# Patient Record
Sex: FEMALE | Race: WHITE | URBAN - METROPOLITAN AREA
[De-identification: names, ages, dates, MRNs, and addresses within clinical notes are randomized per-mention and may not be internally consistent; named-entity substitution may affect disease eponyms.]

---

## 2020-08-17 ENCOUNTER — WEB ENCOUNTER (OUTPATIENT)
Dept: URBAN - METROPOLITAN AREA CLINIC 113 | Facility: CLINIC | Age: 75
End: 2020-08-17

## 2020-08-17 ENCOUNTER — LAB OUTSIDE AN ENCOUNTER (OUTPATIENT)
Dept: URBAN - METROPOLITAN AREA CLINIC 113 | Facility: CLINIC | Age: 75
End: 2020-08-17

## 2020-08-17 ENCOUNTER — OFFICE VISIT (OUTPATIENT)
Dept: URBAN - METROPOLITAN AREA CLINIC 113 | Facility: CLINIC | Age: 75
End: 2020-08-17
Payer: MEDICARE

## 2020-08-17 VITALS
HEART RATE: 72 BPM | TEMPERATURE: 96.9 F | WEIGHT: 179 LBS | SYSTOLIC BLOOD PRESSURE: 148 MMHG | RESPIRATION RATE: 18 BRPM | HEIGHT: 64 IN | DIASTOLIC BLOOD PRESSURE: 81 MMHG | BODY MASS INDEX: 30.56 KG/M2

## 2020-08-17 DIAGNOSIS — R10.13 EPIGASTRIC ABDOMINAL PAIN: ICD-10-CM

## 2020-08-17 DIAGNOSIS — R11.0 NAUSEA: ICD-10-CM

## 2020-08-17 DIAGNOSIS — K21.9 GERD: ICD-10-CM

## 2020-08-17 DIAGNOSIS — K59.01 CONSTIPATION: ICD-10-CM

## 2020-08-17 PROCEDURE — G8427 DOCREV CUR MEDS BY ELIG CLIN: HCPCS | Performed by: NURSE PRACTITIONER

## 2020-08-17 PROCEDURE — 99204 OFFICE O/P NEW MOD 45 MIN: CPT | Performed by: NURSE PRACTITIONER

## 2020-08-17 PROCEDURE — 1036F TOBACCO NON-USER: CPT | Performed by: NURSE PRACTITIONER

## 2020-08-17 RX ORDER — LINACLOTIDE 72 UG/1
1 CAPSULE AT LEAST 30 MINUTES BEFORE THE FIRST MEAL OF THE DAY ON AN EMPTY STOMACH CAPSULE, GELATIN COATED ORAL ONCE A DAY
Qty: 30 | Refills: 3 | OUTPATIENT
Start: 2020-08-17 | End: 2020-12-14

## 2020-08-17 RX ORDER — LINACLOTIDE 145 UG/1
1 CAPSULE AT LEAST 30 MINUTES BEFORE THE FIRST MEAL OF THE DAY ON AN EMPTY STOMACH CAPSULE, GELATIN COATED ORAL ONCE A DAY
Status: ACTIVE | COMMUNITY

## 2020-08-17 RX ORDER — METOPROLOL TARTRATE 25 MG/1
1 TABLET WITH FOOD TABLET, FILM COATED ORAL TWICE A DAY
Status: ACTIVE | COMMUNITY

## 2020-08-17 RX ORDER — OMEPRAZOLE 20 MG/1
1 CAPSULE 30 MINUTES BEFORE MORNING MEAL CAPSULE, DELAYED RELEASE ORAL ONCE A DAY
Status: ACTIVE | COMMUNITY

## 2020-08-17 RX ORDER — TRIAMTERENE AND HYDROCHLOROTHIAZIDE 75; 50 MG/1; MG/1
1 TABLET IN THE MORNING TABLET ORAL ONCE A DAY
Status: ACTIVE | COMMUNITY

## 2020-08-17 RX ORDER — ROSUVASTATIN CALCIUM 10 MG/1
1 TABLET TABLET, FILM COATED ORAL ONCE A DAY
Status: ACTIVE | COMMUNITY

## 2020-08-17 RX ORDER — EZETIMIBE 10 MG/1
1 TABLET TABLET ORAL ONCE A DAY
Status: ACTIVE | COMMUNITY

## 2020-08-17 RX ORDER — OMEPRAZOLE 40 MG/1
1 CAPSULE 30 MINUTES BEFORE MORNING MEAL CAPSULE, DELAYED RELEASE ORAL ONCE A DAY
Qty: 90 | Refills: 3

## 2020-08-17 NOTE — HPI-TODAY'S VISIT:
She reports a several year history of intermittent abdominal pain, bloating, and nausea, which have worsened within the last several months.  She complains of daily exacerbations of postprandial epigastric pain, excess belching, heartburn and regurgitation despite omeprazole 20 mg daily.  She has chronic constipation at baseline, and is having a bowel movement every 2 to 3 days with Metamucil.  She takes Linzess 145 mcg as needed, due to the side effect of diarrhea.  In the past, MiraLAX was ineffective, and milk of magnesia resulted in diarrhea.  She reports occasional small-volume red blood per rectum with wiping following a hard or straining stool. Her last colonoscopy was performed in 2016 by Dr. Welch, and was notable for the removal of colon polyps.  She is due for surveillance colonoscopy next year.  She admits to the use of Advil at least twice daily for management of headaches.  She has never had an EGD.  No recent abdominal imaging.  She denies a family history of GI malignancy.

## 2020-08-24 ENCOUNTER — CLAIMS CREATED FROM THE CLAIM WINDOW (OUTPATIENT)
Dept: URBAN - METROPOLITAN AREA CLINIC 4 | Facility: CLINIC | Age: 75
End: 2020-08-24
Payer: MEDICARE

## 2020-08-24 ENCOUNTER — OFFICE VISIT (OUTPATIENT)
Dept: URBAN - METROPOLITAN AREA SURGERY CENTER 25 | Facility: SURGERY CENTER | Age: 75
End: 2020-08-24
Payer: MEDICARE

## 2020-08-24 DIAGNOSIS — K29.60 OTHER GASTRITIS WITHOUT BLEEDING: ICD-10-CM

## 2020-08-24 DIAGNOSIS — K31.7 GASTRIC POLYP: ICD-10-CM

## 2020-08-24 DIAGNOSIS — R13.10 DYSPHAGIA: ICD-10-CM

## 2020-08-24 DIAGNOSIS — K44.9 HIATAL HERNIA: ICD-10-CM

## 2020-08-24 DIAGNOSIS — K31.7 POLYP OF STOMACH AND DUODENUM: ICD-10-CM

## 2020-08-24 DIAGNOSIS — K21.9 GASTRIC REFLUX: ICD-10-CM

## 2020-08-24 PROCEDURE — 88305 TISSUE EXAM BY PATHOLOGIST: CPT | Performed by: PATHOLOGY

## 2020-08-24 PROCEDURE — 43239 EGD BIOPSY SINGLE/MULTIPLE: CPT | Performed by: INTERNAL MEDICINE

## 2020-08-24 PROCEDURE — 88312 SPECIAL STAINS GROUP 1: CPT | Performed by: PATHOLOGY

## 2020-08-24 RX ORDER — LINACLOTIDE 145 UG/1
1 CAPSULE AT LEAST 30 MINUTES BEFORE THE FIRST MEAL OF THE DAY ON AN EMPTY STOMACH CAPSULE, GELATIN COATED ORAL ONCE A DAY
Status: ACTIVE | COMMUNITY

## 2020-08-24 RX ORDER — ROSUVASTATIN CALCIUM 10 MG/1
1 TABLET TABLET, FILM COATED ORAL ONCE A DAY
Status: ACTIVE | COMMUNITY

## 2020-08-24 RX ORDER — TRIAMTERENE AND HYDROCHLOROTHIAZIDE 75; 50 MG/1; MG/1
1 TABLET IN THE MORNING TABLET ORAL ONCE A DAY
Status: ACTIVE | COMMUNITY

## 2020-08-24 RX ORDER — METOPROLOL TARTRATE 25 MG/1
1 TABLET WITH FOOD TABLET, FILM COATED ORAL TWICE A DAY
Status: ACTIVE | COMMUNITY

## 2020-08-24 RX ORDER — OMEPRAZOLE 40 MG/1
1 CAPSULE 30 MINUTES BEFORE MORNING MEAL CAPSULE, DELAYED RELEASE ORAL ONCE A DAY
Qty: 90 | Refills: 3 | Status: ACTIVE | COMMUNITY

## 2020-08-24 RX ORDER — LINACLOTIDE 72 UG/1
1 CAPSULE AT LEAST 30 MINUTES BEFORE THE FIRST MEAL OF THE DAY ON AN EMPTY STOMACH CAPSULE, GELATIN COATED ORAL ONCE A DAY
Qty: 30 | Refills: 3 | Status: ACTIVE | COMMUNITY
Start: 2020-08-17 | End: 2020-12-14

## 2020-08-24 RX ORDER — EZETIMIBE 10 MG/1
1 TABLET TABLET ORAL ONCE A DAY
Status: ACTIVE | COMMUNITY

## 2020-09-01 ENCOUNTER — OFFICE VISIT (OUTPATIENT)
Dept: URBAN - METROPOLITAN AREA CLINIC 113 | Facility: CLINIC | Age: 75
End: 2020-09-01
Payer: MEDICARE

## 2020-09-01 VITALS
SYSTOLIC BLOOD PRESSURE: 123 MMHG | HEART RATE: 77 BPM | RESPIRATION RATE: 20 BRPM | TEMPERATURE: 98.4 F | DIASTOLIC BLOOD PRESSURE: 76 MMHG | HEIGHT: 64 IN | BODY MASS INDEX: 30.3 KG/M2 | WEIGHT: 177.5 LBS

## 2020-09-01 DIAGNOSIS — K21.9 GERD: ICD-10-CM

## 2020-09-01 DIAGNOSIS — K59.01 CONSTIPATION: ICD-10-CM

## 2020-09-01 DIAGNOSIS — R11.0 NAUSEA: ICD-10-CM

## 2020-09-01 DIAGNOSIS — R10.13 EPIGASTRIC ABDOMINAL PAIN: ICD-10-CM

## 2020-09-01 PROCEDURE — 99213 OFFICE O/P EST LOW 20 MIN: CPT | Performed by: NURSE PRACTITIONER

## 2020-09-01 PROCEDURE — G8427 DOCREV CUR MEDS BY ELIG CLIN: HCPCS | Performed by: NURSE PRACTITIONER

## 2020-09-01 RX ORDER — ROSUVASTATIN CALCIUM 10 MG/1
1 TABLET TABLET, FILM COATED ORAL ONCE A DAY
Status: ACTIVE | COMMUNITY

## 2020-09-01 RX ORDER — LINACLOTIDE 145 UG/1
1 CAPSULE AT LEAST 30 MINUTES BEFORE THE FIRST MEAL OF THE DAY ON AN EMPTY STOMACH CAPSULE, GELATIN COATED ORAL ONCE A DAY
Status: DISCONTINUED | COMMUNITY

## 2020-09-01 RX ORDER — EZETIMIBE 10 MG/1
1 TABLET TABLET ORAL ONCE A DAY
Status: ACTIVE | COMMUNITY

## 2020-09-01 RX ORDER — ONDANSETRON 4 MG/1
PLACE 1 TABLET ON THE TONGUE AND ALLOW TO DISSOLVE, EVERY 4-6 HOURS AS NEEDED FOR NAUSEA TABLET, ORALLY DISINTEGRATING ORAL
Qty: 45 TABLET | Refills: 1 | OUTPATIENT
Start: 2020-09-01

## 2020-09-01 RX ORDER — LINACLOTIDE 72 UG/1
1 CAPSULE AT LEAST 30 MINUTES BEFORE THE FIRST MEAL OF THE DAY ON AN EMPTY STOMACH CAPSULE, GELATIN COATED ORAL ONCE A DAY
Qty: 30 | Refills: 3 | Status: ACTIVE | COMMUNITY
Start: 2020-08-17 | End: 2020-12-14

## 2020-09-01 RX ORDER — METOPROLOL TARTRATE 25 MG/1
1/2 TABLET WITH FOOD TABLET, FILM COATED ORAL TWICE A DAY
Status: ACTIVE | COMMUNITY

## 2020-09-01 RX ORDER — TRIAMTERENE AND HYDROCHLOROTHIAZIDE 75; 50 MG/1; MG/1
1 TABLET IN THE MORNING TABLET ORAL ONCE A DAY
Status: ACTIVE | COMMUNITY

## 2020-09-01 RX ORDER — LINACLOTIDE 72 UG/1
1 CAPSULE AT LEAST 30 MINUTES BEFORE THE FIRST MEAL OF THE DAY ON AN EMPTY STOMACH CAPSULE, GELATIN COATED ORAL ONCE A DAY
Qty: 90 | Refills: 3

## 2020-09-01 RX ORDER — OMEPRAZOLE 40 MG/1
1 CAPSULE 30 MINUTES BEFORE MORNING MEAL CAPSULE, DELAYED RELEASE ORAL ONCE A DAY
Qty: 90 | Refills: 3 | Status: ACTIVE | COMMUNITY

## 2020-09-01 NOTE — HPI-TODAY'S VISIT:
74-year-old female with history of hypertension, hyperlipidemia, and colon polyps, presenting for follow-up after EGD. She was last seen 8/17/2020 for evaluation of postprandial epigastric pain, nausea, and GERD, refractory to low-dose PPI.  She was instructed to increase omeprazole to 40 mg daily, and labs, a CT of the abdomen and pelvis, and EGD were planned.  Regarding chronic constipation, her Linzess was reduced to 72mcg daily due to the side effect of diarrhea with 145mcg dose. CT of the abdomen and pelvis with contrast on 8/31/2020 was notable for diverticulosis in the sigmoid colon, several simple appearing cysts in both kidneys, and prior hysterectomy. EGD 8/24/2020 revealed a normal esophagus, medium-sized hiatal hernia, chronic gastritis, and a single gastric polyp. We are awaiting pathology results. She had her bloodwork performed at [facility], however, results are not readily available for review. Fortunately, her abdominal symptoms have nearly resolved. She has only experienced one episode of recurrent postprandial abdominal pain, which she attributes to dietary indiscretion. She has occasional nausea, but has not experienced further vomiting. Her GERD is controlled with omeprazole. She is consuming smaller, more frequent meals, and has avoided eating late in the evening. She is keeping a food and symptom diary in an effort to identify triggers. Her constipation has responded to low-dose Linzess, and she is having a daily nonbloody stool. She has not experienced diarrhea with a reduction in dosing.

## 2020-11-17 ENCOUNTER — OFFICE VISIT (OUTPATIENT)
Dept: URBAN - METROPOLITAN AREA CLINIC 113 | Facility: CLINIC | Age: 75
End: 2020-11-17
Payer: MEDICARE

## 2020-11-17 ENCOUNTER — LAB OUTSIDE AN ENCOUNTER (OUTPATIENT)
Dept: URBAN - METROPOLITAN AREA CLINIC 113 | Facility: CLINIC | Age: 75
End: 2020-11-17

## 2020-11-17 VITALS
HEART RATE: 77 BPM | RESPIRATION RATE: 18 BRPM | BODY MASS INDEX: 31.41 KG/M2 | DIASTOLIC BLOOD PRESSURE: 74 MMHG | TEMPERATURE: 97.6 F | SYSTOLIC BLOOD PRESSURE: 133 MMHG | WEIGHT: 184 LBS | HEIGHT: 64 IN

## 2020-11-17 DIAGNOSIS — R14.0 ABDOMINAL BLOATING: ICD-10-CM

## 2020-11-17 DIAGNOSIS — R11.0 NAUSEA: ICD-10-CM

## 2020-11-17 DIAGNOSIS — K21.9 GERD: ICD-10-CM

## 2020-11-17 DIAGNOSIS — Z86.010 HISTORY OF COLON POLYPS: ICD-10-CM

## 2020-11-17 DIAGNOSIS — K59.01 CONSTIPATION: ICD-10-CM

## 2020-11-17 DIAGNOSIS — R10.13 EPIGASTRIC ABDOMINAL PAIN: ICD-10-CM

## 2020-11-17 DIAGNOSIS — R10.30 LOWER ABDOMINAL PAIN: ICD-10-CM

## 2020-11-17 PROCEDURE — G8483 FLU IMM NO ADMIN DOC REA: HCPCS | Performed by: INTERNAL MEDICINE

## 2020-11-17 PROCEDURE — 99214 OFFICE O/P EST MOD 30 MIN: CPT | Performed by: INTERNAL MEDICINE

## 2020-11-17 PROCEDURE — G8420 CALC BMI NORM PARAMETERS: HCPCS | Performed by: INTERNAL MEDICINE

## 2020-11-17 PROCEDURE — 3017F COLORECTAL CA SCREEN DOC REV: CPT | Performed by: INTERNAL MEDICINE

## 2020-11-17 RX ORDER — METOPROLOL TARTRATE 25 MG/1
1/2 TABLET WITH FOOD TABLET, FILM COATED ORAL TWICE A DAY
Status: ACTIVE | COMMUNITY

## 2020-11-17 RX ORDER — HYOSCYAMINE SULFATE 0.12 MG/1
1 TABLET UNDER THE TONGUE AND ALLOW TO DISSOLVE  AS NEEDED TABLET, ORALLY DISINTEGRATING ORAL THREE TIMES A DAY
Qty: 90 | Refills: 2 | OUTPATIENT

## 2020-11-17 RX ORDER — LINACLOTIDE 72 UG/1
1 CAPSULE AT LEAST 30 MINUTES BEFORE THE FIRST MEAL OF THE DAY ON AN EMPTY STOMACH CAPSULE, GELATIN COATED ORAL ONCE A DAY
Qty: 90 | Refills: 3 | Status: ACTIVE | COMMUNITY

## 2020-11-17 RX ORDER — RIFAXIMIN 550 MG/1
1 TABLET TABLET ORAL TWICE A DAY
Qty: 60 | OUTPATIENT
Start: 2020-11-17 | End: 2020-12-17

## 2020-11-17 RX ORDER — OMEPRAZOLE 40 MG/1
1 CAPSULE 30 MINUTES BEFORE MORNING MEAL CAPSULE, DELAYED RELEASE ORAL ONCE A DAY
Qty: 90 | Refills: 3 | Status: ACTIVE | COMMUNITY

## 2020-11-17 RX ORDER — ROSUVASTATIN CALCIUM 10 MG/1
1 TABLET TABLET, FILM COATED ORAL ONCE A DAY
Status: ACTIVE | COMMUNITY

## 2020-11-17 RX ORDER — ONDANSETRON 4 MG/1
PLACE 1 TABLET ON THE TONGUE AND ALLOW TO DISSOLVE, EVERY 4-6 HOURS AS NEEDED FOR NAUSEA TABLET, ORALLY DISINTEGRATING ORAL
Qty: 45 TABLET | Refills: 1 | Status: ACTIVE | COMMUNITY
Start: 2020-09-01

## 2020-11-17 RX ORDER — SODIUM, POTASSIUM,MAG SULFATES 17.5-3.13G
354ML SOLUTION, RECONSTITUTED, ORAL ORAL
Qty: 20 | Refills: 1 | OUTPATIENT

## 2020-11-17 RX ORDER — EZETIMIBE 10 MG/1
1 TABLET TABLET ORAL ONCE A DAY
Status: ACTIVE | COMMUNITY

## 2020-11-17 RX ORDER — TRIAMTERENE AND HYDROCHLOROTHIAZIDE 75; 50 MG/1; MG/1
1 TABLET IN THE MORNING TABLET ORAL ONCE A DAY
Status: ACTIVE | COMMUNITY

## 2020-11-17 NOTE — HPI-TODAY'S VISIT:
74-year-old female with history of hypertension, hyperlipidemia, and colon polyps, presenting for follow-up after EGD. She was seen 8/17/2020 for evaluation of postprandial epigastric pain, nausea, and GERD, refractory to low-dose PPI.  She was instructed to increase omeprazole to 40 mg daily, and labs, a CT of the abdomen and pelvis, and EGD were planned.  Regarding chronic constipation, her Linzess was reduced to 72mcg daily due to the side effect of diarrhea with 145mcg dose. CT of the abdomen and pelvis with contrast on 8/31/2020 was notable for diverticulosis in the sigmoid colon, several simple appearing cysts in both kidneys, and prior hysterectomy. EGD 8/24/2020 revealed a normal esophagus, medium-sized hiatal hernia, chronic gastritis, and a single gastric polyp. We are awaiting pathology results. She had her bloodwork performed at [facility], however, results are not readily available for review. At the time of her last office visit on 9/1/20, her abdominal symptoms had nearly resolved. She had only experienced one episode of recurrent postprandial abdominal pain, which she attributed to dietary indiscretion. She noted occasional nausea, but had not experienced further vomiting. Her GERD was controlled with omeprazole. She was consuming smaller, more frequent meals, and has avoided eating late in the evening. She is keeping a food and symptom diary in an effort to identify triggers. Her constipation had responded to low-dose Linzess, and she was having a daily nonbloody stool. She has not experienced diarrhea after the reduction in Linzess dosing. The patient returns today with no further burning epigastric pain and reflux symptoms, which are controlled on omeprazole.  She does describe postprandial intermittent epigastric and periumbilical pain which is not present every day for which comes and goes.  She feels bloated and gassy after eating.  When she has these episodes of pain, they can last all day.  Her bowel habits remain somewhat unpredictable.  When she takes low-dose Linzess, she will have a large somewhat loose bowel movement.  She did have labs done at [facility] on May 11, 2020 which showed a normal CBC, sedimentation rate of 20, and normal complete metabolic panel. Of note, the patient's last colonoscopy was done by Dr. Welch in [place] in 2016.  This was reportedly normal. She remains concerned about the persistent nature of her symptoms and her alteration in bowel habits.  Notably, the patient has lost no weight.

## 2021-01-07 ENCOUNTER — OFFICE VISIT (OUTPATIENT)
Dept: URBAN - METROPOLITAN AREA SURGERY CENTER 25 | Facility: SURGERY CENTER | Age: 76
End: 2021-01-07

## 2021-03-08 ENCOUNTER — TELEPHONE ENCOUNTER (OUTPATIENT)
Dept: URBAN - METROPOLITAN AREA CLINIC 113 | Facility: CLINIC | Age: 76
End: 2021-03-08

## 2021-03-10 PROBLEM — 422587007 NAUSEA: Status: ACTIVE | Noted: 2020-08-17

## 2021-03-18 ENCOUNTER — CLAIMS CREATED FROM THE CLAIM WINDOW (OUTPATIENT)
Dept: URBAN - METROPOLITAN AREA CLINIC 4 | Facility: CLINIC | Age: 76
End: 2021-03-18
Payer: MEDICARE

## 2021-03-18 ENCOUNTER — OFFICE VISIT (OUTPATIENT)
Dept: URBAN - METROPOLITAN AREA SURGERY CENTER 25 | Facility: SURGERY CENTER | Age: 76
End: 2021-03-18
Payer: MEDICARE

## 2021-03-18 DIAGNOSIS — D12.3 BENIGN NEOPLASM OF TRANSVERSE COLON: ICD-10-CM

## 2021-03-18 DIAGNOSIS — D12.3 ADENOMA OF TRANSVERSE COLON: ICD-10-CM

## 2021-03-18 DIAGNOSIS — Z86.010 H/O ADENOMATOUS POLYP OF COLON: ICD-10-CM

## 2021-03-18 PROCEDURE — 45385 COLONOSCOPY W/LESION REMOVAL: CPT | Performed by: INTERNAL MEDICINE

## 2021-03-18 PROCEDURE — 88305 TISSUE EXAM BY PATHOLOGIST: CPT | Performed by: PATHOLOGY

## 2021-03-18 PROCEDURE — G8907 PT DOC NO EVENTS ON DISCHARG: HCPCS | Performed by: INTERNAL MEDICINE

## 2021-03-18 RX ORDER — METOPROLOL TARTRATE 25 MG/1
1/2 TABLET WITH FOOD TABLET, FILM COATED ORAL TWICE A DAY
Status: ACTIVE | COMMUNITY

## 2021-03-18 RX ORDER — HYOSCYAMINE SULFATE 0.12 MG/1
1 TABLET UNDER THE TONGUE AND ALLOW TO DISSOLVE  AS NEEDED TABLET, ORALLY DISINTEGRATING ORAL THREE TIMES A DAY
Qty: 90 | Refills: 2 | Status: ACTIVE | COMMUNITY

## 2021-03-18 RX ORDER — SODIUM, POTASSIUM,MAG SULFATES 17.5-3.13G
354ML SOLUTION, RECONSTITUTED, ORAL ORAL
Qty: 20 | Refills: 1 | Status: ACTIVE | COMMUNITY

## 2021-03-18 RX ORDER — LINACLOTIDE 72 UG/1
1 CAPSULE AT LEAST 30 MINUTES BEFORE THE FIRST MEAL OF THE DAY ON AN EMPTY STOMACH CAPSULE, GELATIN COATED ORAL ONCE A DAY
Qty: 90 | Refills: 3 | Status: ACTIVE | COMMUNITY

## 2021-03-18 RX ORDER — TRIAMTERENE AND HYDROCHLOROTHIAZIDE 75; 50 MG/1; MG/1
1 TABLET IN THE MORNING TABLET ORAL ONCE A DAY
Status: ACTIVE | COMMUNITY

## 2021-03-18 RX ORDER — ROSUVASTATIN CALCIUM 10 MG/1
1 TABLET TABLET, FILM COATED ORAL ONCE A DAY
Status: ACTIVE | COMMUNITY

## 2021-03-18 RX ORDER — ONDANSETRON 4 MG/1
PLACE 1 TABLET ON THE TONGUE AND ALLOW TO DISSOLVE, EVERY 4-6 HOURS AS NEEDED FOR NAUSEA TABLET, ORALLY DISINTEGRATING ORAL
Qty: 45 TABLET | Refills: 1 | Status: ACTIVE | COMMUNITY
Start: 2020-09-01

## 2021-03-18 RX ORDER — OMEPRAZOLE 40 MG/1
1 CAPSULE 30 MINUTES BEFORE MORNING MEAL CAPSULE, DELAYED RELEASE ORAL ONCE A DAY
Qty: 90 | Refills: 3 | Status: ACTIVE | COMMUNITY

## 2021-03-18 RX ORDER — EZETIMIBE 10 MG/1
1 TABLET TABLET ORAL ONCE A DAY
Status: ACTIVE | COMMUNITY

## 2021-04-06 ENCOUNTER — TELEPHONE ENCOUNTER (OUTPATIENT)
Dept: URBAN - METROPOLITAN AREA CLINIC 113 | Facility: CLINIC | Age: 76
End: 2021-04-06

## 2021-04-06 RX ORDER — LINACLOTIDE 145 UG/1
1 CAPSULE AT LEAST 30 MINUTES BEFORE THE FIRST MEAL OF THE DAY ON AN EMPTY STOMACH CAPSULE, GELATIN COATED ORAL ONCE A DAY
Qty: 90 | Refills: 3

## 2021-04-22 ENCOUNTER — TELEPHONE ENCOUNTER (OUTPATIENT)
Dept: URBAN - METROPOLITAN AREA CLINIC 113 | Facility: CLINIC | Age: 76
End: 2021-04-22

## 2021-04-22 RX ORDER — SUCRALFATE 1 G/1
1 TABLET ON AN EMPTY STOMACH TABLET ORAL TWICE A DAY
Qty: 60 | OUTPATIENT
Start: 2021-04-22 | End: 2021-05-22

## 2021-05-17 ENCOUNTER — OFFICE VISIT (OUTPATIENT)
Dept: URBAN - METROPOLITAN AREA CLINIC 113 | Facility: CLINIC | Age: 76
End: 2021-05-17

## 2021-07-01 ENCOUNTER — WEB ENCOUNTER (OUTPATIENT)
Dept: URBAN - METROPOLITAN AREA CLINIC 107 | Facility: CLINIC | Age: 76
End: 2021-07-01

## 2021-07-01 ENCOUNTER — OFFICE VISIT (OUTPATIENT)
Dept: URBAN - METROPOLITAN AREA CLINIC 107 | Facility: CLINIC | Age: 76
End: 2021-07-01
Payer: MEDICARE

## 2021-07-01 VITALS
WEIGHT: 178 LBS | HEART RATE: 83 BPM | HEIGHT: 64 IN | BODY MASS INDEX: 30.39 KG/M2 | SYSTOLIC BLOOD PRESSURE: 147 MMHG | DIASTOLIC BLOOD PRESSURE: 83 MMHG | TEMPERATURE: 98.7 F

## 2021-07-01 DIAGNOSIS — K21.9 GERD: ICD-10-CM

## 2021-07-01 DIAGNOSIS — K59.01 CONSTIPATION: ICD-10-CM

## 2021-07-01 DIAGNOSIS — R10.13 EPIGASTRIC ABDOMINAL PAIN: ICD-10-CM

## 2021-07-01 DIAGNOSIS — Z86.010 HISTORY OF COLON POLYPS: ICD-10-CM

## 2021-07-01 PROCEDURE — 99213 OFFICE O/P EST LOW 20 MIN: CPT | Performed by: INTERNAL MEDICINE

## 2021-07-01 RX ORDER — LINACLOTIDE 145 UG/1
1 CAPSULE AT LEAST 30 MINUTES BEFORE THE FIRST MEAL OF THE DAY ON AN EMPTY STOMACH CAPSULE, GELATIN COATED ORAL ONCE A DAY
Qty: 90 | Refills: 4

## 2021-07-01 RX ORDER — SODIUM, POTASSIUM,MAG SULFATES 17.5-3.13G
354ML SOLUTION, RECONSTITUTED, ORAL ORAL
Qty: 20 | Refills: 1 | Status: ON HOLD | COMMUNITY

## 2021-07-01 RX ORDER — OMEPRAZOLE 40 MG/1
1 CAPSULE 30 MINUTES BEFORE MORNING MEAL CAPSULE, DELAYED RELEASE ORAL ONCE A DAY
Qty: 90 | Refills: 3 | Status: ACTIVE | COMMUNITY

## 2021-07-01 RX ORDER — TRIAMTERENE AND HYDROCHLOROTHIAZIDE 75; 50 MG/1; MG/1
1 TABLET IN THE MORNING TABLET ORAL ONCE A DAY
Status: ACTIVE | COMMUNITY

## 2021-07-01 RX ORDER — SUCRALFATE 1 G/1
1 TABLET ON AN EMPTY STOMACH TABLET ORAL
Status: ACTIVE | COMMUNITY

## 2021-07-01 RX ORDER — EZETIMIBE 10 MG/1
1 TABLET TABLET ORAL ONCE A DAY
Status: ON HOLD | COMMUNITY

## 2021-07-01 RX ORDER — ONDANSETRON 4 MG/1
PLACE 1 TABLET ON THE TONGUE AND ALLOW TO DISSOLVE, EVERY 4-6 HOURS AS NEEDED FOR NAUSEA TABLET, ORALLY DISINTEGRATING ORAL
Qty: 45 TABLET | Refills: 1 | Status: ON HOLD | COMMUNITY
Start: 2020-09-01

## 2021-07-01 RX ORDER — SUCRALFATE 1 G/1
1 TABLET TABLET ORAL
Qty: 90 | Refills: 4

## 2021-07-01 RX ORDER — METOPROLOL TARTRATE 25 MG/1
1/2 TABLET WITH FOOD TABLET, FILM COATED ORAL TWICE A DAY
Status: ACTIVE | COMMUNITY

## 2021-07-01 RX ORDER — ROSUVASTATIN CALCIUM 10 MG/1
1 TABLET TABLET, FILM COATED ORAL ONCE A DAY
Status: ON HOLD | COMMUNITY

## 2021-07-01 RX ORDER — OMEPRAZOLE 40 MG/1
1 CAPSULE 30 MINUTES BEFORE MORNING MEAL CAPSULE, DELAYED RELEASE ORAL ONCE A DAY
Qty: 90 | Refills: 4

## 2021-07-01 RX ORDER — HYOSCYAMINE SULFATE 0.12 MG/1
1 TABLET UNDER THE TONGUE AND ALLOW TO DISSOLVE  AS NEEDED TABLET, ORALLY DISINTEGRATING ORAL THREE TIMES A DAY
Qty: 90 | Refills: 2 | Status: ACTIVE | COMMUNITY

## 2021-07-01 RX ORDER — HYOSCYAMINE SULFATE 0.12 MG/1
1 TABLET UNDER THE TONGUE AND ALLOW TO DISSOLVE  AS NEEDED TABLET, ORALLY DISINTEGRATING ORAL
Qty: 90 | Refills: 2

## 2021-07-01 RX ORDER — LINACLOTIDE 145 UG/1
1 CAPSULE AT LEAST 30 MINUTES BEFORE THE FIRST MEAL OF THE DAY ON AN EMPTY STOMACH CAPSULE, GELATIN COATED ORAL ONCE A DAY
Qty: 90 | Refills: 3 | Status: ACTIVE | COMMUNITY

## 2021-07-01 NOTE — HPI-TODAY'S VISIT:
This is a 75-year-old female with a history of chronic intermittent abdominal pain, bloating, nausea and constipation presenting for follow-up.   She was last seen 8/17/2020 reporting worsening postprandial epigastric pain, nausea, and refractory GERD despite low-dose PPI.  Peptic ulcer disease was a consideration.  Omeprazole was increased to 40 mg daily and she was scheduled for an EGD.  Labs and a CT scan were ordered.  She had persistent constipation.  She had experienced diarrhea on Linzess 145 mcg.  She was given a trial of Linzess 72 mcg daily.  She was subsequently scheduled for a colonoscopy.  She called for follow-up a month ago because she was having frequent small-volume, unformed bowel movements during the day and night.  She had associated proctalgia.  This persisted for 2 weeks.  She held Linzess while this was occurring.  She had associated abdominal pain.  This has resolved.  Heartburn is controlled with omeprazole.  Occasional exacerbations are relieved with sucralfate.  She has occasional aching abdominal pain.  This is chronic and relieved with hyoscyamine which she is taking 3 times a day routinely.  She is taking Linzess 72 mcg daily or every other day.  If she does not take Linzess, she uses stool softeners.  She is having regular bowel movements without red blood or melena.  She takes Metamucil daily.

## 2021-07-01 NOTE — HPI-OTHER HISTORIES
Colonoscopy 3/18/2021:BBPS 9, sigmoid/descending/splenic flexure diverticulosis, removal of a 6 mm transverse sessile tubular adenoma, mild nonbleeding internal hemorrhoids.  Surveillance recommended in 2026. EGD 8/24/2020:Normal esophagus, medium size hiatal hernia, chronic gastritis status post biopsy, gastric polyp status post biopsy, normal examined duodenum.  Pathology: Antral biopsy demonstrated chemical/reactive gastropathy with focal healing erosion.  No evidence of H. pylori or intestinal metaplasia.  Stomach body biopsy demonstrated fundic gland polyp. CT of the abdomen and pelvis with contrast 8/31/2020:No acute abnormality in the abdomen or pelvis.  Diverticulosis of the sigmoid colon without evidence of acute diverticulitis.  Several simple appearing cysts in both kidneys.  Hysterectomy.

## 2021-07-03 PROBLEM — 79922009 EPIGASTRIC PAIN: Status: ACTIVE | Noted: 2020-09-01

## 2021-07-03 PROBLEM — 14760008 CONSTIPATION: Status: ACTIVE | Noted: 2020-08-17

## 2021-07-06 ENCOUNTER — OFFICE VISIT (OUTPATIENT)
Dept: URBAN - METROPOLITAN AREA CLINIC 113 | Facility: CLINIC | Age: 76
End: 2021-07-06

## 2021-08-20 ENCOUNTER — TELEPHONE ENCOUNTER (OUTPATIENT)
Dept: URBAN - METROPOLITAN AREA SURGERY CENTER 30 | Facility: SURGERY CENTER | Age: 76
End: 2021-08-20

## 2021-08-20 RX ORDER — ONDANSETRON 4 MG/1
PLACE 1 TABLET ON THE TONGUE AND ALLOW TO DISSOLVE, EVERY 4-6 HOURS AS NEEDED FOR NAUSEA TABLET, ORALLY DISINTEGRATING ORAL
Qty: 45 TABLET | Refills: 1
Start: 2020-09-01

## 2021-08-31 ENCOUNTER — TELEPHONE ENCOUNTER (OUTPATIENT)
Dept: URBAN - METROPOLITAN AREA CLINIC 113 | Facility: CLINIC | Age: 76
End: 2021-08-31

## 2021-08-31 RX ORDER — ONDANSETRON 4 MG/1
PLACE 1 TABLET ON THE TONGUE AND ALLOW TO DISSOLVE, EVERY 4-6 HOURS AS NEEDED FOR NAUSEA TABLET, ORALLY DISINTEGRATING ORAL
Qty: 90 | Refills: 1
Start: 2020-09-01

## 2021-08-31 RX ORDER — HYOSCYAMINE SULFATE 0.12 MG/1
1 TABLET UNDER THE TONGUE AND ALLOW TO DISSOLVE  AS NEEDED TABLET, ORALLY DISINTEGRATING ORAL
Qty: 90 | Refills: 2

## 2021-08-31 RX ORDER — LINACLOTIDE 145 UG/1
1 CAPSULE AT LEAST 30 MINUTES BEFORE THE FIRST MEAL OF THE DAY ON AN EMPTY STOMACH CAPSULE, GELATIN COATED ORAL ONCE A DAY
Qty: 90 | Refills: 2

## 2021-09-01 ENCOUNTER — TELEPHONE ENCOUNTER (OUTPATIENT)
Dept: URBAN - METROPOLITAN AREA CLINIC 23 | Facility: CLINIC | Age: 76
End: 2021-09-01

## 2021-09-01 RX ORDER — OMEPRAZOLE 40 MG/1
1 CAPSULE 30 MINUTES BEFORE MORNING MEAL CAPSULE, DELAYED RELEASE ORAL ONCE A DAY
Qty: 90 | Refills: 4

## 2022-03-07 ENCOUNTER — ERX REFILL RESPONSE (OUTPATIENT)
Dept: URBAN - METROPOLITAN AREA CLINIC 113 | Facility: CLINIC | Age: 77
End: 2022-03-07

## 2022-03-07 RX ORDER — LINACLOTIDE 72 UG/1
TAKE 1 CAPSULE BY MOUTH EVERY MORNING BEFORE BREAKFAST ON AN EMPTY STOMACH **THANK-YOU** CAPSULE, GELATIN COATED ORAL
Qty: 30 CAPSULE | Refills: 1 | OUTPATIENT

## 2022-04-08 ENCOUNTER — TELEPHONE ENCOUNTER (OUTPATIENT)
Dept: URBAN - METROPOLITAN AREA CLINIC 92 | Facility: CLINIC | Age: 77
End: 2022-04-08

## 2022-04-08 RX ORDER — LINACLOTIDE 145 UG/1
1 CAPSULE AT LEAST 30 MINUTES BEFORE THE FIRST MEAL OF THE DAY ON AN EMPTY STOMACH CAPSULE, GELATIN COATED ORAL ONCE A DAY
Qty: 90 | Refills: 0

## 2022-06-27 ENCOUNTER — OFFICE VISIT (OUTPATIENT)
Dept: URBAN - METROPOLITAN AREA CLINIC 107 | Facility: CLINIC | Age: 77
End: 2022-06-27

## 2022-07-13 ENCOUNTER — OFFICE VISIT (OUTPATIENT)
Dept: URBAN - METROPOLITAN AREA CLINIC 107 | Facility: CLINIC | Age: 77
End: 2022-07-13
Payer: MEDICARE

## 2022-07-13 VITALS
HEIGHT: 64 IN | WEIGHT: 172.4 LBS | BODY MASS INDEX: 29.43 KG/M2 | TEMPERATURE: 98.2 F | SYSTOLIC BLOOD PRESSURE: 144 MMHG | DIASTOLIC BLOOD PRESSURE: 72 MMHG | HEART RATE: 78 BPM

## 2022-07-13 DIAGNOSIS — K21.9 GERD: ICD-10-CM

## 2022-07-13 DIAGNOSIS — K58.1 IRRITABLE BOWEL SYNDROME WITH CONSTIPATION: ICD-10-CM

## 2022-07-13 PROBLEM — 440630006 IRRITABLE BOWEL SYNDROME CHARACTERIZED BY CONSTIPATION: Status: ACTIVE | Noted: 2022-07-13

## 2022-07-13 PROCEDURE — 99214 OFFICE O/P EST MOD 30 MIN: CPT | Performed by: NURSE PRACTITIONER

## 2022-07-13 RX ORDER — HYOSCYAMINE SULFATE 0.12 MG/1
1 TABLET UNDER THE TONGUE AND ALLOW TO DISSOLVE  AS NEEDED TABLET, ORALLY DISINTEGRATING ORAL
Qty: 90 | Refills: 2
End: 2023-04-09

## 2022-07-13 RX ORDER — HYOSCYAMINE SULFATE 0.12 MG/1
1 TABLET UNDER THE TONGUE AND ALLOW TO DISSOLVE  AS NEEDED TABLET, ORALLY DISINTEGRATING ORAL
Qty: 90 | Refills: 2 | Status: ACTIVE | COMMUNITY

## 2022-07-13 RX ORDER — OMEPRAZOLE 40 MG/1
1 CAPSULE 30 MINUTES BEFORE MORNING MEAL CAPSULE, DELAYED RELEASE ORAL ONCE A DAY
Qty: 90 | Refills: 4 | Status: ACTIVE | COMMUNITY

## 2022-07-13 RX ORDER — ONDANSETRON 4 MG/1
PLACE 1 TABLET ON THE TONGUE AND ALLOW TO DISSOLVE, EVERY 4-6 HOURS AS NEEDED FOR NAUSEA TABLET, ORALLY DISINTEGRATING ORAL
Qty: 90 | Refills: 1 | Status: ACTIVE | COMMUNITY
Start: 2020-09-01

## 2022-07-13 RX ORDER — ANASTROZOLE 1 MG/1
1 TABLET TABLET, FILM COATED ORAL ONCE A DAY
Status: ACTIVE | COMMUNITY

## 2022-07-13 RX ORDER — LINACLOTIDE 145 UG/1
1 CAPSULE AT LEAST 30 MINUTES BEFORE THE FIRST MEAL OF THE DAY ON AN EMPTY STOMACH CAPSULE, GELATIN COATED ORAL ONCE A DAY
Qty: 90 | Refills: 0 | Status: ACTIVE | COMMUNITY

## 2022-07-13 RX ORDER — OMEPRAZOLE 40 MG/1
1 CAPSULE 30 MINUTES BEFORE MORNING MEAL CAPSULE, DELAYED RELEASE ORAL ONCE A DAY
Qty: 90 | Refills: 4

## 2022-07-13 RX ORDER — LINACLOTIDE 145 UG/1
1 CAPSULE AT LEAST 30 MINUTES BEFORE THE FIRST MEAL OF THE DAY ON AN EMPTY STOMACH CAPSULE, GELATIN COATED ORAL ONCE A DAY
Qty: 90 | Refills: 3

## 2022-07-13 RX ORDER — SUCRALFATE 1 G/1
1 TABLET TABLET ORAL
Qty: 90 | Refills: 4
End: 2023-10-06

## 2022-07-13 RX ORDER — SUCRALFATE 1 G/1
1 TABLET TABLET ORAL
Qty: 90 | Refills: 4 | Status: ACTIVE | COMMUNITY

## 2022-07-13 RX ORDER — METOPROLOL TARTRATE 25 MG/1
1/2 TABLET WITH FOOD TABLET, FILM COATED ORAL TWICE A DAY
Status: ACTIVE | COMMUNITY

## 2022-07-13 RX ORDER — TRIAMTERENE AND HYDROCHLOROTHIAZIDE 75; 50 MG/1; MG/1
1 TABLET IN THE MORNING TABLET ORAL ONCE A DAY
Status: ACTIVE | COMMUNITY

## 2022-07-13 RX ORDER — ONDANSETRON 4 MG/1
PLACE 1 TABLET ON THE TONGUE AND ALLOW TO DISSOLVE, EVERY 4-6 HOURS AS NEEDED FOR NAUSEA TABLET, ORALLY DISINTEGRATING ORAL
Qty: 90 | Refills: 1
Start: 2020-09-01

## 2022-07-13 NOTE — HPI-TODAY'S VISIT:
76-year-old female with a history of chronic intermittent abdominal pain, bloating, nausea and constipation presenting for annual follow-up/medication refill.    She was last seen in the office in July 2021 for follow-up of chronic abdominal pain, constipation, and GERD. Her symptoms were overall managed with Linzess and omeprazole, with stool softeners, hyoscyamine, and sucralfate as needed.  Her symptoms have been fairly stable over the last year, however, within the last two weeks, she has experienced worsening constipation. Her stools have reduced from 3-4 stools per day to 1 per day. She complains of small-volume stools and incomplete evacuation. She has occasional lower abdominal cramping and excess gas, which responds to hyoscyamine when needed. Over the last several weeks, she has been heavily involved with various events at her [employer]. She admits to poor dietary habits and decreased fluid intake, which she feels may have contributed to the exacerbation of constipation. She denies heartburn on omeprazole. She has occasional nausea which responds to ondansetron when needed. She complains of tailbone pain which is relieved with use of a heating pad.

## 2022-10-27 ENCOUNTER — TELEPHONE ENCOUNTER (OUTPATIENT)
Dept: URBAN - METROPOLITAN AREA CLINIC 107 | Facility: CLINIC | Age: 77
End: 2022-10-27

## 2022-10-31 ENCOUNTER — TELEPHONE ENCOUNTER (OUTPATIENT)
Dept: URBAN - METROPOLITAN AREA CLINIC 113 | Facility: CLINIC | Age: 77
End: 2022-10-31

## 2022-10-31 PROBLEM — 8114009 DIVERTICULOSIS OF SMALL INTESTINE: Status: ACTIVE | Noted: 2022-10-28

## 2022-11-08 ENCOUNTER — TELEPHONE ENCOUNTER (OUTPATIENT)
Dept: URBAN - METROPOLITAN AREA CLINIC 113 | Facility: CLINIC | Age: 77
End: 2022-11-08

## 2022-11-08 RX ORDER — ANASTROZOLE 1 MG/1
1 TABLET TABLET, FILM COATED ORAL ONCE A DAY
Status: ACTIVE | COMMUNITY

## 2022-11-08 RX ORDER — CIPROFLOXACIN HYDROCHLORIDE 500 MG/1
1 TABLET TABLET, FILM COATED ORAL
Qty: 20 | Refills: 0 | OUTPATIENT
Start: 2022-11-08 | End: 2022-11-18

## 2022-11-08 RX ORDER — SUCRALFATE 1 G/1
1 TABLET TABLET ORAL
Qty: 90 | Refills: 4 | Status: ACTIVE | COMMUNITY
End: 2023-10-06

## 2022-11-08 RX ORDER — METRONIDAZOLE 500 MG/1
1 TABLET TABLET ORAL THREE TIMES A DAY
Qty: 30 | Refills: 0 | OUTPATIENT
Start: 2022-11-08 | End: 2022-11-18

## 2022-11-08 RX ORDER — METOPROLOL TARTRATE 25 MG/1
1/2 TABLET WITH FOOD TABLET, FILM COATED ORAL TWICE A DAY
Status: ACTIVE | COMMUNITY

## 2022-11-08 RX ORDER — OMEPRAZOLE 40 MG/1
1 CAPSULE 30 MINUTES BEFORE MORNING MEAL CAPSULE, DELAYED RELEASE ORAL ONCE A DAY
Qty: 90 | Refills: 4 | Status: ACTIVE | COMMUNITY

## 2022-11-08 RX ORDER — ONDANSETRON 4 MG/1
PLACE 1 TABLET ON THE TONGUE AND ALLOW TO DISSOLVE, EVERY 4-6 HOURS AS NEEDED FOR NAUSEA TABLET, ORALLY DISINTEGRATING ORAL
Qty: 90 | Refills: 1 | Status: ACTIVE | COMMUNITY
Start: 2020-09-01

## 2022-11-08 RX ORDER — LINACLOTIDE 145 UG/1
1 CAPSULE AT LEAST 30 MINUTES BEFORE THE FIRST MEAL OF THE DAY ON AN EMPTY STOMACH CAPSULE, GELATIN COATED ORAL ONCE A DAY
Qty: 90 | Refills: 3 | Status: ACTIVE | COMMUNITY

## 2022-11-08 RX ORDER — TRIAMTERENE AND HYDROCHLOROTHIAZIDE 75; 50 MG/1; MG/1
1 TABLET IN THE MORNING TABLET ORAL ONCE A DAY
Status: ACTIVE | COMMUNITY

## 2022-11-08 RX ORDER — HYOSCYAMINE SULFATE 0.12 MG/1
1 TABLET UNDER THE TONGUE AND ALLOW TO DISSOLVE  AS NEEDED TABLET, ORALLY DISINTEGRATING ORAL
Qty: 90 | Refills: 2 | Status: ACTIVE | COMMUNITY
End: 2023-04-09

## 2022-11-11 ENCOUNTER — OFFICE VISIT (OUTPATIENT)
Dept: URBAN - METROPOLITAN AREA CLINIC 113 | Facility: CLINIC | Age: 77
End: 2022-11-11

## 2023-06-09 ENCOUNTER — OFFICE VISIT (OUTPATIENT)
Dept: URBAN - METROPOLITAN AREA CLINIC 113 | Facility: CLINIC | Age: 78
End: 2023-06-09

## 2023-07-20 ENCOUNTER — TELEPHONE ENCOUNTER (OUTPATIENT)
Dept: URBAN - METROPOLITAN AREA CLINIC 113 | Facility: CLINIC | Age: 78
End: 2023-07-20

## 2023-08-01 ENCOUNTER — DASHBOARD ENCOUNTERS (OUTPATIENT)
Age: 78
End: 2023-08-01

## 2023-08-01 ENCOUNTER — OFFICE VISIT (OUTPATIENT)
Dept: URBAN - METROPOLITAN AREA CLINIC 107 | Facility: CLINIC | Age: 78
End: 2023-08-01
Payer: MEDICARE

## 2023-08-01 VITALS
BODY MASS INDEX: 28.51 KG/M2 | TEMPERATURE: 97.1 F | HEART RATE: 76 BPM | DIASTOLIC BLOOD PRESSURE: 75 MMHG | HEIGHT: 64 IN | WEIGHT: 167 LBS | RESPIRATION RATE: 18 BRPM | SYSTOLIC BLOOD PRESSURE: 138 MMHG

## 2023-08-01 DIAGNOSIS — R10.30 LOWER ABDOMINAL PAIN: ICD-10-CM

## 2023-08-01 DIAGNOSIS — Z86.010 HISTORY OF ADENOMATOUS POLYP OF COLON: ICD-10-CM

## 2023-08-01 DIAGNOSIS — K59.09 CHRONIC CONSTIPATION: ICD-10-CM

## 2023-08-01 DIAGNOSIS — K58.1 IRRITABLE BOWEL SYNDROME WITH CONSTIPATION: ICD-10-CM

## 2023-08-01 DIAGNOSIS — Z87.19 HISTORY OF DIVERTICULITIS OF COLON: ICD-10-CM

## 2023-08-01 DIAGNOSIS — R14.0 ABDOMINAL BLOATING: ICD-10-CM

## 2023-08-01 DIAGNOSIS — K21.9 GERD WITHOUT ESOPHAGITIS: ICD-10-CM

## 2023-08-01 PROCEDURE — 99214 OFFICE O/P EST MOD 30 MIN: CPT | Performed by: NURSE PRACTITIONER

## 2023-08-01 RX ORDER — OMEPRAZOLE 40 MG/1
1 CAPSULE 30 MINUTES BEFORE MORNING MEAL CAPSULE, DELAYED RELEASE ORAL ONCE A DAY
Qty: 90 | Refills: 4 | Status: ACTIVE | COMMUNITY

## 2023-08-01 RX ORDER — ONDANSETRON 4 MG/1
PLACE 1 TABLET ON THE TONGUE AND ALLOW TO DISSOLVE, EVERY 4-6 HOURS AS NEEDED FOR NAUSEA TABLET, ORALLY DISINTEGRATING ORAL
Qty: 90 | Refills: 1 | Status: ACTIVE | COMMUNITY
Start: 2020-09-01

## 2023-08-01 RX ORDER — SUCRALFATE 1 G/1
1 TABLET TABLET ORAL
Qty: 90 | Refills: 4 | Status: ACTIVE | COMMUNITY
End: 2023-10-06

## 2023-08-01 RX ORDER — TRIAMTERENE AND HYDROCHLOROTHIAZIDE 75; 50 MG/1; MG/1
1 TABLET IN THE MORNING TABLET ORAL ONCE A DAY
Status: ACTIVE | COMMUNITY

## 2023-08-01 RX ORDER — ANASTROZOLE 1 MG/1
1 TABLET TABLET, FILM COATED ORAL ONCE A DAY
Status: ACTIVE | COMMUNITY

## 2023-08-01 RX ORDER — METOPROLOL TARTRATE 25 MG/1
1/2 TABLET WITH FOOD TABLET, FILM COATED ORAL TWICE A DAY
Status: ACTIVE | COMMUNITY

## 2023-08-01 RX ORDER — LINACLOTIDE 145 UG/1
1 CAPSULE AT LEAST 30 MINUTES BEFORE THE FIRST MEAL OF THE DAY ON AN EMPTY STOMACH CAPSULE, GELATIN COATED ORAL ONCE A DAY
Qty: 90 | Refills: 3 | Status: ACTIVE | COMMUNITY

## 2023-08-01 NOTE — HPI-TODAY'S VISIT:
This is a 77-year-old female with a history of hypertension, hyperlipidemia, breast cancer, history of adenomatous colon polyps due surveillance in 2026, GERD, chronic intermittent abdominal pain, bloating, nausea, and constipation associated with a functional GI disorder presenting for follow-up. She was last seen in the office 7/13/2022.  She had a recent exacerbation of constipation likely secondary to stress, diet, and decreased fluid intake.  She was to complete a bowel cleanse with magnesium citrate and continue daily Linzess 145 mcg.  She was to add MiraLAX when needed.  She had benefited from taking hyoscyamine in the past.  This was refilled.  GERD was well controlled with omeprazole 40 mg daily. She had an episode of CT documented diverticulitis in November 2022 treated with Cipro and Flagyl. Three months ago, she had a low-grade fever and left lower quadrant abdominal pain.  Her primary care provider prescribed Cipro and Flagyl for 7 days.  Her symptoms improved.  She has occasional soreness in the lower abdomen associated with passing gas.  This may be associated with constipation.  She is taking Linzess 145 mcg daily.  After a dose, she usually has bowel movements for 2 hours.  Therefore, she intermittently omits a dose if she has to leave her home.  Afterward, she will experience an exacerbation of constipation.  This occurred 2 or 3 weeks ago and she had lower abdominal achy pain that was "miserable" and finally relieved with a bowel movement.  Since then, she reports a sensation that she has gas she is unable to expel.  She has associated bloating.  She has tried Carafate, hyoscyamine, and Gas-X without improvement.  She has increased water intake and has modified her diet.  This is likewise been unhelpful.  She admits 1 episode of small-volume red blood per rectum.  This has not recurred.  She has chronic nausea for which she requires ondansetron intermittently.  This usually occurs after eating and she can prevent symptoms with eating smaller meals.  Acid reflux is controlled with omeprazole and sucralfate as needed.  She denies dysphagia or any other abdominal symptoms. She stopped drinking milk 4 or 5 weeks ago.

## 2023-08-05 PROBLEM — 429047008: Status: ACTIVE | Noted: 2023-08-05

## 2023-08-05 PROBLEM — 266435005: Status: ACTIVE | Noted: 2023-08-05

## 2023-08-10 ENCOUNTER — ERX REFILL RESPONSE (OUTPATIENT)
Dept: URBAN - METROPOLITAN AREA CLINIC 113 | Facility: CLINIC | Age: 78
End: 2023-08-10

## 2023-08-10 ENCOUNTER — WEB ENCOUNTER (OUTPATIENT)
Dept: URBAN - METROPOLITAN AREA CLINIC 107 | Facility: CLINIC | Age: 78
End: 2023-08-10

## 2023-08-10 RX ORDER — OMEPRAZOLE 40 MG/1
TAKE 1 CAPSULE BY MOUTH ONCE DAILY 30 MINUTES BEFORE MORNING MEAL CAPSULE, DELAYED RELEASE ORAL
Qty: 90 CAPSULE | Refills: 4

## 2023-08-10 RX ORDER — OMEPRAZOLE 40 MG/1
TAKE 1 CAPSULE BY MOUTH ONCE DAILY 30 MINUTES BEFORE MORNING MEAL CAPSULE, DELAYED RELEASE ORAL
Qty: 90 CAPSULE | Refills: 4 | OUTPATIENT

## 2023-08-10 RX ORDER — OMEPRAZOLE 40 MG/1
1 CAPSULE 30 MINUTES BEFORE MORNING MEAL CAPSULE, DELAYED RELEASE ORAL ONCE A DAY
Qty: 90 | Refills: 4 | OUTPATIENT

## 2023-08-15 ENCOUNTER — TELEPHONE ENCOUNTER (OUTPATIENT)
Dept: URBAN - METROPOLITAN AREA CLINIC 113 | Facility: CLINIC | Age: 78
End: 2023-08-15

## 2023-08-15 RX ORDER — ONDANSETRON 4 MG/1
PLACE 1 TABLET ON THE TONGUE AND ALLOW TO DISSOLVE, EVERY 4-6 HOURS AS NEEDED FOR NAUSEA TABLET, ORALLY DISINTEGRATING ORAL
Qty: 90 | Refills: 1
Start: 2020-09-01

## 2023-08-15 RX ORDER — HYOSCYAMINE SULFATE 0.12 MG/1
1 TABLET UNDER THE TONGUE AND ALLOW TO DISSOLVE  AS NEEDED TABLET, ORALLY DISINTEGRATING ORAL
Qty: 90 | Refills: 2
End: 2024-05-11

## 2023-08-15 RX ORDER — LINACLOTIDE 72 UG/1
1 CAPSULE AT LEAST 30 MINUTES BEFORE THE FIRST MEAL OF THE DAY ON AN EMPTY STOMACH CAPSULE, GELATIN COATED ORAL ONCE A DAY
Qty: 30 | Refills: 3 | OUTPATIENT
Start: 2023-08-15 | End: 2023-12-12

## 2023-11-07 ENCOUNTER — OFFICE VISIT (OUTPATIENT)
Dept: URBAN - METROPOLITAN AREA CLINIC 107 | Facility: CLINIC | Age: 78
End: 2023-11-07

## 2024-06-17 ENCOUNTER — ERX REFILL RESPONSE (OUTPATIENT)
Dept: URBAN - METROPOLITAN AREA CLINIC 113 | Facility: CLINIC | Age: 79
End: 2024-06-17

## 2024-06-17 RX ORDER — LINACLOTIDE 72 UG/1
TAKE 1 CAPSULE BY MOUTH ONCE DAILY AT LEAST 30 MINUTES BEFORE THE FIRST MEAL OF THE DAY ON AN EMPTY STOMACH CAPSULE, GELATIN COATED ORAL
Qty: 30 CAPSULE | Refills: 3 | OUTPATIENT

## 2024-06-17 RX ORDER — LINACLOTIDE 72 UG/1
TAKE 1 CAPSULE BY MOUTH ONCE DAILY AT LEAST 30 MINUTES BEFORE THE FIRST MEAL OF THE DAY ON AN EMPTY STOMACH CAPSULE, GELATIN COATED ORAL
Qty: 30 CAPSULE | Refills: 4 | OUTPATIENT

## 2024-07-02 ENCOUNTER — OFFICE VISIT (OUTPATIENT)
Dept: URBAN - METROPOLITAN AREA CLINIC 107 | Facility: CLINIC | Age: 79
End: 2024-07-02

## 2024-07-02 NOTE — HPI-TODAY'S VISIT:
This is a 78-year-old female with a history of hypertension, hyperlipidemia, breast cancer, adenomatous colon polyps due surveillance in 2026, GERD, chronic intermittent abdominal pain, bloating, nausea, and constipation associated with a functional GI disorder presenting for follow-up.  She was last seen in the office 8/1/2023.  GERD was essentially controlled with omeprazole 40 mg daily and Carafate as needed.  She had lower abdominal pain, bloating, excessive gas, and constipation consistent with IBS.  She had identified some dietary triggers and was avoiding them.  She had exacerbations of constipation after intentionally omitting a dose of Linzess in order to avoid stools over 2 hours.  She was to try Linzess 72 mcg daily and consider changing timing of dosing to prior to supper to avoid interruptions in her therapy or in her schedule.  If this was ineffective, she was to resume 145 mcg.  It was recommended that she modify her diet to decrease foods high in FODMAPs and try IBgard.  She was to use it regularly if it was effective and was to continue hyoscyamine as needed.  She had been treated in November 2022 for CT documented uncomplicated diverticulitis.  3 months prior to her visit, she had required additional treatment.  She was asymptomatic at the time of her visit.  She was to call for recurrent symptoms.

## 2024-08-05 ENCOUNTER — ERX REFILL RESPONSE (OUTPATIENT)
Dept: URBAN - METROPOLITAN AREA CLINIC 113 | Facility: CLINIC | Age: 79
End: 2024-08-05

## 2024-08-05 RX ORDER — LINACLOTIDE 145 UG/1
1 CAPSULE AT LEAST 30 MINUTES BEFORE THE FIRST MEAL OF THE DAY ON AN EMPTY STOMACH CAPSULE, GELATIN COATED ORAL ONCE A DAY
Qty: 90 | Refills: 3 | OUTPATIENT

## 2024-08-05 RX ORDER — LINACLOTIDE 145 UG/1
TAKE 1 CAPSULE BY MOUTH ON AN EMPTY STOMACH AT LEAST 30 MINUTES BEFORE THE FIRST MEAL OF THE DAY CAPSULE, GELATIN COATED ORAL
Qty: 90 CAPSULE | Refills: 0 | OUTPATIENT

## 2024-08-13 ENCOUNTER — OFFICE VISIT (OUTPATIENT)
Dept: URBAN - METROPOLITAN AREA CLINIC 107 | Facility: CLINIC | Age: 79
End: 2024-08-13

## 2024-08-20 ENCOUNTER — OFFICE VISIT (OUTPATIENT)
Dept: URBAN - METROPOLITAN AREA CLINIC 113 | Facility: CLINIC | Age: 79
End: 2024-08-20
Payer: MEDICARE

## 2024-08-20 VITALS
DIASTOLIC BLOOD PRESSURE: 73 MMHG | WEIGHT: 159.2 LBS | SYSTOLIC BLOOD PRESSURE: 128 MMHG | TEMPERATURE: 97.9 F | HEIGHT: 64 IN | HEART RATE: 75 BPM | BODY MASS INDEX: 27.18 KG/M2

## 2024-08-20 DIAGNOSIS — K59.09 CHRONIC CONSTIPATION: ICD-10-CM

## 2024-08-20 DIAGNOSIS — R11.0 NAUSEA: ICD-10-CM

## 2024-08-20 DIAGNOSIS — K21.9 GERD WITHOUT ESOPHAGITIS: ICD-10-CM

## 2024-08-20 PROBLEM — 236069009: Status: ACTIVE | Noted: 2024-08-20

## 2024-08-20 PROBLEM — 266435005: Status: ACTIVE | Noted: 2024-08-20

## 2024-08-20 PROCEDURE — 99213 OFFICE O/P EST LOW 20 MIN: CPT | Performed by: NURSE PRACTITIONER

## 2024-08-20 RX ORDER — LINACLOTIDE 145 UG/1
TAKE 1 CAPSULE BY MOUTH ON AN EMPTY STOMACH AT LEAST 30 MINUTES BEFORE THE FIRST MEAL OF THE DAY CAPSULE, GELATIN COATED ORAL
Qty: 90 CAPSULE | Refills: 0 | Status: ACTIVE | COMMUNITY

## 2024-08-20 RX ORDER — TRIAMTERENE AND HYDROCHLOROTHIAZIDE 75; 50 MG/1; MG/1
1 TABLET IN THE MORNING TABLET ORAL ONCE A DAY
Status: ACTIVE | COMMUNITY

## 2024-08-20 RX ORDER — SUCRALFATE 1 G/1
1 TABLET TABLET ORAL
Qty: 30 | Refills: 3

## 2024-08-20 RX ORDER — ANASTROZOLE 1 MG/1
1 TABLET TABLET, FILM COATED ORAL ONCE A DAY
Status: ACTIVE | COMMUNITY

## 2024-08-20 RX ORDER — LINACLOTIDE 145 UG/1
TAKE 1 CAPSULE BY MOUTH ON AN EMPTY STOMACH AT LEAST 30 MINUTES BEFORE THE FIRST MEAL OF THE DAY CAPSULE, GELATIN COATED ORAL
Qty: 90 | Refills: 2

## 2024-08-20 RX ORDER — METOPROLOL TARTRATE 25 MG/1
1/2 TABLET WITH FOOD TABLET, FILM COATED ORAL TWICE A DAY
Status: ACTIVE | COMMUNITY

## 2024-08-20 RX ORDER — OMEPRAZOLE 40 MG/1
TAKE 1 CAPSULE BY MOUTH ONCE DAILY 30 MINUTES BEFORE MORNING MEAL CAPSULE, DELAYED RELEASE ORAL
Qty: 90 CAPSULE | Refills: 3

## 2024-08-20 RX ORDER — ONDANSETRON 4 MG/1
PLACE 1 TABLET ON THE TONGUE AND ALLOW TO DISSOLVE, EVERY 4-6 HOURS AS NEEDED FOR NAUSEA TABLET, ORALLY DISINTEGRATING ORAL
Qty: 90 | Refills: 1 | Status: ACTIVE | COMMUNITY
Start: 2020-09-01

## 2024-08-20 RX ORDER — LINACLOTIDE 72 UG/1
TAKE 1 CAPSULE BY MOUTH ONCE DAILY AT LEAST 30 MINUTES BEFORE THE FIRST MEAL OF THE DAY ON AN EMPTY STOMACH CAPSULE, GELATIN COATED ORAL
Qty: 30 | Refills: 3

## 2024-08-20 RX ORDER — LINACLOTIDE 72 UG/1
TAKE 1 CAPSULE BY MOUTH ONCE DAILY AT LEAST 30 MINUTES BEFORE THE FIRST MEAL OF THE DAY ON AN EMPTY STOMACH CAPSULE, GELATIN COATED ORAL
Qty: 30 CAPSULE | Refills: 3 | Status: ACTIVE | COMMUNITY

## 2024-08-20 RX ORDER — OMEPRAZOLE 40 MG/1
TAKE 1 CAPSULE BY MOUTH ONCE DAILY 30 MINUTES BEFORE MORNING MEAL CAPSULE, DELAYED RELEASE ORAL
Qty: 90 CAPSULE | Refills: 4 | Status: ACTIVE | COMMUNITY

## 2024-08-20 RX ORDER — ONDANSETRON 4 MG/1
PLACE 1 TABLET ON THE TONGUE AND ALLOW TO DISSOLVE, EVERY 4-6 HOURS AS NEEDED FOR NAUSEA TABLET, ORALLY DISINTEGRATING ORAL
Qty: 90 | Refills: 1
Start: 2020-09-01

## 2024-08-20 NOTE — HPI-OTHER HISTORIES
Colonoscopy 3/18/2021:BBPS 9, sigmoid/descending/splenic flexure diverticulosis, removal of a 6 mm transverse sessile tubular adenoma, mild nonbleeding internal hemorrhoids.  Surveillance recommended in 2026.recall set EGD 8/24/2020:Normal esophagus, medium size hiatal hernia, chronic gastritis status post biopsy, gastric polyp status post biopsy, normal examined duodenum.  Pathology: Antral biopsy demonstrated chemical/reactive gastropathy with focal healing erosion.  No evidence of H. pylori or intestinal metaplasia.  Stomach body biopsy demonstrated fundic gland polyp. CT of the abdomen and pelvis with contrast 8/31/2020:No acute abnormality in the abdomen or pelvis.  Diverticulosis of the sigmoid colon without evidence of acute diverticulitis.  Several simple appearing cysts in both kidneys.  Hysterectomy.

## 2024-08-20 NOTE — HPI-TODAY'S VISIT:
She is taking Linzess 72 mcg or Linzess 145 mcg on a daily basis per her response.  For the last 2 months, she has required 145 mcg daily.  She has a normal bowel movement after taking a dose and a second stool that is "like a flush."  When she is taking 72 mcg daily, after 2 days, she has bloating and feels as though she is "not emptying."  She requests a refill on both medications.  She has infrequent nausea in the mornings relieved with drinking ginger ale.  She has ondansetron to use if needed.  She has infrequent bloating for which she takes Carafate dissolved in water.  She is taking omeprazole daily.  Acid reflux is well-controlled.  She is requesting refills on all of her medications.

## 2024-11-14 ENCOUNTER — TELEPHONE ENCOUNTER (OUTPATIENT)
Dept: URBAN - METROPOLITAN AREA CLINIC 113 | Facility: CLINIC | Age: 79
End: 2024-11-14

## 2025-02-20 ENCOUNTER — OFFICE VISIT (OUTPATIENT)
Dept: URBAN - METROPOLITAN AREA CLINIC 113 | Facility: CLINIC | Age: 80
End: 2025-02-20
Payer: COMMERCIAL

## 2025-02-20 ENCOUNTER — LAB OUTSIDE AN ENCOUNTER (OUTPATIENT)
Dept: URBAN - METROPOLITAN AREA CLINIC 113 | Facility: CLINIC | Age: 80
End: 2025-02-20

## 2025-02-20 VITALS
RESPIRATION RATE: 18 BRPM | BODY MASS INDEX: 27.01 KG/M2 | WEIGHT: 158.2 LBS | TEMPERATURE: 97.9 F | HEIGHT: 64 IN | DIASTOLIC BLOOD PRESSURE: 69 MMHG | SYSTOLIC BLOOD PRESSURE: 126 MMHG | HEART RATE: 83 BPM

## 2025-02-20 DIAGNOSIS — K58.1 IRRITABLE BOWEL SYNDROME WITH CONSTIPATION: ICD-10-CM

## 2025-02-20 DIAGNOSIS — R10.31 RIGHT LOWER QUADRANT ABDOMINAL PAIN: ICD-10-CM

## 2025-02-20 DIAGNOSIS — R10.32 LEFT LOWER QUADRANT ABDOMINAL PAIN: ICD-10-CM

## 2025-02-20 DIAGNOSIS — R35.0 INCREASED URINARY FREQUENCY: ICD-10-CM

## 2025-02-20 DIAGNOSIS — R11.0 NAUSEA: ICD-10-CM

## 2025-02-20 DIAGNOSIS — K21.9 GERD WITHOUT ESOPHAGITIS: ICD-10-CM

## 2025-02-20 PROBLEM — 301754002: Status: ACTIVE | Noted: 2025-02-20

## 2025-02-20 PROBLEM — 301716002: Status: ACTIVE | Noted: 2025-02-20

## 2025-02-20 PROBLEM — 162116003: Status: ACTIVE | Noted: 2025-02-20

## 2025-02-20 PROCEDURE — 99214 OFFICE O/P EST MOD 30 MIN: CPT | Performed by: NURSE PRACTITIONER

## 2025-02-20 RX ORDER — OMEPRAZOLE 40 MG/1
TAKE 1 CAPSULE BY MOUTH ONCE DAILY 30 MINUTES BEFORE MORNING MEAL CAPSULE, DELAYED RELEASE ORAL ONCE A DAY
Qty: 90 | Refills: 3

## 2025-02-20 RX ORDER — METOPROLOL TARTRATE 25 MG/1
1/2 TABLET WITH FOOD TABLET, FILM COATED ORAL TWICE A DAY
Status: ACTIVE | COMMUNITY

## 2025-02-20 RX ORDER — TRIAMTERENE AND HYDROCHLOROTHIAZIDE 75; 50 MG/1; MG/1
1 TABLET IN THE MORNING TABLET ORAL ONCE A DAY
Status: ACTIVE | COMMUNITY

## 2025-02-20 RX ORDER — HYOSCYAMINE SULFATE 0.125 MG
1 - 2 TABLETS UNDER THE TONGUE AND ALLOW TO DISSOLVE TABLET,DISINTEGRATING ORAL
Qty: 60 | Refills: 3 | OUTPATIENT
Start: 2025-02-20 | End: 2025-06-20

## 2025-02-20 RX ORDER — OMEPRAZOLE 40 MG/1
TAKE 1 CAPSULE BY MOUTH ONCE DAILY 30 MINUTES BEFORE MORNING MEAL CAPSULE, DELAYED RELEASE ORAL
Qty: 90 CAPSULE | Refills: 3 | Status: ACTIVE | COMMUNITY

## 2025-02-20 RX ORDER — SUCRALFATE 1 G/1
1 TABLET TABLET ORAL
Qty: 30 | Refills: 3
End: 2026-02-15

## 2025-02-20 RX ORDER — LINACLOTIDE 145 UG/1
TAKE 1 CAPSULE BY MOUTH ON AN EMPTY STOMACH AT LEAST 30 MINUTES BEFORE THE FIRST MEAL OF THE DAY CAPSULE, GELATIN COATED ORAL
Qty: 90 | Refills: 2 | Status: ACTIVE | COMMUNITY

## 2025-02-20 RX ORDER — LINACLOTIDE 72 UG/1
TAKE 1 CAPSULE BY MOUTH ONCE DAILY AT LEAST 30 MINUTES BEFORE THE FIRST MEAL OF THE DAY ON AN EMPTY STOMACH CAPSULE, GELATIN COATED ORAL
Qty: 30 | Refills: 3 | Status: ACTIVE | COMMUNITY

## 2025-02-20 RX ORDER — LINACLOTIDE 145 UG/1
TAKE 1 CAPSULE BY MOUTH ON AN EMPTY STOMACH AT LEAST 30 MINUTES BEFORE THE FIRST MEAL OF THE DAY CAPSULE, GELATIN COATED ORAL
Qty: 30 | Refills: 3

## 2025-02-20 RX ORDER — ANASTROZOLE 1 MG/1
1 TABLET TABLET, FILM COATED ORAL ONCE A DAY
Status: ACTIVE | COMMUNITY

## 2025-02-20 RX ORDER — ONDANSETRON 4 MG/1
PLACE 1 TABLET ON THE TONGUE AND ALLOW TO DISSOLVE, EVERY 4-6 HOURS AS NEEDED FOR NAUSEA TABLET, ORALLY DISINTEGRATING ORAL
Qty: 90 | Refills: 1
Start: 2020-09-01

## 2025-02-20 RX ORDER — LINACLOTIDE 72 UG/1
TAKE 1 CAPSULE BY MOUTH ONCE DAILY AT LEAST 30 MINUTES BEFORE THE FIRST MEAL OF THE DAY ON AN EMPTY STOMACH CAPSULE, GELATIN COATED ORAL
Qty: 90 | Refills: 3

## 2025-02-20 RX ORDER — SUCRALFATE 1 G/1
1 TABLET TABLET ORAL
Qty: 30 | Refills: 3 | Status: ACTIVE | COMMUNITY

## 2025-02-20 RX ORDER — ONDANSETRON 4 MG/1
PLACE 1 TABLET ON THE TONGUE AND ALLOW TO DISSOLVE, EVERY 4-6 HOURS AS NEEDED FOR NAUSEA TABLET, ORALLY DISINTEGRATING ORAL
Qty: 90 | Refills: 1 | Status: ACTIVE | COMMUNITY
Start: 2020-09-01

## 2025-02-20 NOTE — HPI-TODAY'S VISIT:
This is a 79-year-old Female with a history of hypertension, hyperlipidemia, breast cancer, adenomatous colon polyps due surveillance in 2026, GERD, chronic intermittent abdominal pain, bloating, nausea, and constipation associated with functional bowel disorder presenting for follow-up. She was last seen 8/20/2024.  GERD was controlled with omeprazole 40 mg daily.  She had infrequent episodes of bloating relieved with Carafate.  She was to continue medications.  Morning nausea was infrequent and usually relieved with a carbonated beverage.  She had chronic constipation which was managed with Linzess 145 mcg daily or Linzess 72 mcg daily.  She was to continue daily use of 1 or the other.  She was to increase 145 mcg to 2/day if needed. She called our office in November 2024 complaining of anal pain.  She denied perianal swelling or bleeding.  She denied constipation or straining.  At a follow-up call, she reported she was feeling much better taking Carafate and hyoscyamine. She reports lower abdominal pain described as soreness.  She indicates the area low in the left lower, right lower quadrants and suprapubic area.  She feels as though her "guts" are sore.  She has taken hyoscyamine which improves pain but does not resolve it.  She is occasionally tender in the area.  She admits an increase in urinary frequency that she attributes to an increase in water intake.  She also feels as though the urine is "hot" but does not cause burning.  On 2/17, she had a fever of 101.9 with chills.  This resolved and has not recurred.  She has a history of intermittent nausea for which she takes ondansetron.  This has been worse since onset of abdominal pain.  She generally feels unwell and reports loss of appetite.  Her weight is stable. She reports that she feels "stuffed with gas" that is difficult to pass prior to her bowel movements.  She is taking Linzess 72 mcg daily and 2 stool softeners.  She typically has 2 or 3 bowel movements every morning.  3-4 times a month, she feels suddenly constipated.  She denies days without a bowel movement.  She will take Linzess 72 mcg, 145 mcg, and stool softeners.  She may have bowel movements throughout the day after taking this or may have to strain.  The following day, she will take Linzess 145 mcg and then resume 72 mcg.  If she takes 145 mcg, she has frequent bowel movements throughout the day and feels depleted.  She denies red blood per rectum or melena.  Heartburn is controlled with omeprazole 40 mg daily and Carafate as needed.

## 2025-02-21 LAB
ABSOLUTE BASOPHILS: 42
ABSOLUTE EOSINOPHILS: 100
ABSOLUTE LYMPHOCYTES: 1394
ABSOLUTE MONOCYTES: 540
ABSOLUTE NEUTROPHILS: 6225
BASOPHILS: 0.5
EOSINOPHILS: 1.2
HEMATOCRIT: 36.8
HEMOGLOBIN: 11.9
LYMPHOCYTES: 16.8
MCH: 26.7
MCHC: 32.3
MCV: 82.5
MONOCYTES: 6.5
MPV: 10.3
NEUTROPHILS: 75
PLATELET COUNT: 413
RDW: 13.5
RED BLOOD CELL COUNT: 4.46
WHITE BLOOD CELL COUNT: 8.3

## 2025-02-22 LAB
APPEARANCE: CLEAR
BACTERIA: (no result)
BILIRUBIN: NEGATIVE
COLOR: YELLOW
CULTURE: (no result)
GLUCOSE: NEGATIVE
HYALINE CAST: (no result)
KETONES: NEGATIVE
LEUKOCYTE ESTERASE: (no result)
NITRITE: NEGATIVE
OCCULT BLOOD: NEGATIVE
PH: 6.5
PROTEIN: (no result)
RBC: (no result)
SPECIFIC GRAVITY: 1.02
SQUAMOUS EPITHELIAL CELLS: (no result)
WBC: (no result)

## 2025-02-28 ENCOUNTER — TELEPHONE ENCOUNTER (OUTPATIENT)
Dept: URBAN - METROPOLITAN AREA CLINIC 113 | Facility: CLINIC | Age: 80
End: 2025-02-28

## 2025-03-04 ENCOUNTER — TELEPHONE ENCOUNTER (OUTPATIENT)
Dept: URBAN - METROPOLITAN AREA CLINIC 113 | Facility: CLINIC | Age: 80
End: 2025-03-04

## 2025-03-06 ENCOUNTER — TELEPHONE ENCOUNTER (OUTPATIENT)
Dept: URBAN - METROPOLITAN AREA CLINIC 6 | Facility: CLINIC | Age: 80
End: 2025-03-06

## 2025-03-12 ENCOUNTER — TELEPHONE ENCOUNTER (OUTPATIENT)
Dept: URBAN - METROPOLITAN AREA CLINIC 113 | Facility: CLINIC | Age: 80
End: 2025-03-12

## 2025-03-21 ENCOUNTER — OFFICE VISIT (OUTPATIENT)
Dept: URBAN - METROPOLITAN AREA CLINIC 113 | Facility: CLINIC | Age: 80
End: 2025-03-21

## 2025-04-17 ENCOUNTER — OFFICE VISIT (OUTPATIENT)
Dept: URBAN - METROPOLITAN AREA CLINIC 107 | Facility: CLINIC | Age: 80
End: 2025-04-17
Payer: COMMERCIAL

## 2025-04-17 DIAGNOSIS — R35.0 INCREASED URINARY FREQUENCY: ICD-10-CM

## 2025-04-17 DIAGNOSIS — K57.92 ACUTE DIVERTICULITIS: ICD-10-CM

## 2025-04-17 DIAGNOSIS — R11.0 NAUSEA: ICD-10-CM

## 2025-04-17 DIAGNOSIS — K58.1 IRRITABLE BOWEL SYNDROME WITH CONSTIPATION: ICD-10-CM

## 2025-04-17 DIAGNOSIS — R10.32 LEFT LOWER QUADRANT ABDOMINAL PAIN: ICD-10-CM

## 2025-04-17 DIAGNOSIS — K21.9 GERD WITHOUT ESOPHAGITIS: ICD-10-CM

## 2025-04-17 DIAGNOSIS — R10.31 RIGHT LOWER QUADRANT ABDOMINAL PAIN: ICD-10-CM

## 2025-04-17 PROBLEM — 735593008: Status: ACTIVE | Noted: 2025-04-17

## 2025-04-17 PROCEDURE — 99213 OFFICE O/P EST LOW 20 MIN: CPT | Performed by: INTERNAL MEDICINE

## 2025-04-17 RX ORDER — OMEPRAZOLE 40 MG/1
TAKE 1 CAPSULE BY MOUTH ONCE DAILY 30 MINUTES BEFORE MORNING MEAL CAPSULE, DELAYED RELEASE ORAL ONCE A DAY
Qty: 90 | Refills: 3
Start: 2025-04-17

## 2025-04-17 RX ORDER — LINACLOTIDE 72 UG/1
TAKE 1 CAPSULE BY MOUTH ONCE DAILY AT LEAST 30 MINUTES BEFORE THE FIRST MEAL OF THE DAY ON AN EMPTY STOMACH CAPSULE, GELATIN COATED ORAL
Qty: 90 | Refills: 3 | Status: ACTIVE | COMMUNITY

## 2025-04-17 RX ORDER — LINACLOTIDE 145 UG/1
TAKE 1 CAPSULE BY MOUTH ON AN EMPTY STOMACH AT LEAST 30 MINUTES BEFORE THE FIRST MEAL OF THE DAY CAPSULE, GELATIN COATED ORAL
Qty: 30 | Refills: 3
Start: 2025-04-17 | End: 2026-04-12

## 2025-04-17 RX ORDER — HYOSCYAMINE SULFATE 0.125 MG
1 - 2 TABLETS UNDER THE TONGUE AND ALLOW TO DISSOLVE TABLET,DISINTEGRATING ORAL
Qty: 60 | Refills: 3 | OUTPATIENT
Start: 2025-04-17

## 2025-04-17 RX ORDER — ONDANSETRON 4 MG/1
PLACE 1 TABLET ON THE TONGUE AND ALLOW TO DISSOLVE, EVERY 4-6 HOURS AS NEEDED FOR NAUSEA TABLET, ORALLY DISINTEGRATING ORAL
Qty: 90 | Refills: 1 | Status: ACTIVE | COMMUNITY
Start: 2020-09-01

## 2025-04-17 RX ORDER — SUCRALFATE 1 G/1
1 TABLET TABLET ORAL
Qty: 30 | Refills: 3 | Status: ACTIVE | COMMUNITY
End: 2026-02-15

## 2025-04-17 RX ORDER — ONDANSETRON 4 MG/1
PLACE 1 TABLET ON THE TONGUE AND ALLOW TO DISSOLVE, EVERY 4-6 HOURS AS NEEDED FOR NAUSEA TABLET, ORALLY DISINTEGRATING ORAL
Qty: 90 | Refills: 1
Start: 2025-04-17

## 2025-04-17 RX ORDER — LINACLOTIDE 72 UG/1
TAKE 1 CAPSULE BY MOUTH ONCE DAILY AT LEAST 30 MINUTES BEFORE THE FIRST MEAL OF THE DAY ON AN EMPTY STOMACH CAPSULE, GELATIN COATED ORAL
Qty: 90 | Refills: 3
Start: 2025-04-17 | End: 2026-04-12

## 2025-04-17 RX ORDER — SUCRALFATE 1 G/1
1 TABLET TABLET ORAL
Qty: 30 | Refills: 3
Start: 2025-04-17

## 2025-04-17 RX ORDER — ANASTROZOLE 1 MG/1
1 TABLET TABLET, FILM COATED ORAL ONCE A DAY
Status: ACTIVE | COMMUNITY

## 2025-04-17 RX ORDER — METOPROLOL TARTRATE 25 MG/1
1/2 TABLET WITH FOOD TABLET, FILM COATED ORAL TWICE A DAY
Status: ACTIVE | COMMUNITY

## 2025-04-17 RX ORDER — OMEPRAZOLE 40 MG/1
TAKE 1 CAPSULE BY MOUTH ONCE DAILY 30 MINUTES BEFORE MORNING MEAL CAPSULE, DELAYED RELEASE ORAL ONCE A DAY
Qty: 90 | Refills: 3 | Status: ACTIVE | COMMUNITY

## 2025-04-17 RX ORDER — LINACLOTIDE 145 UG/1
TAKE 1 CAPSULE BY MOUTH ON AN EMPTY STOMACH AT LEAST 30 MINUTES BEFORE THE FIRST MEAL OF THE DAY CAPSULE, GELATIN COATED ORAL
Qty: 30 | Refills: 3 | Status: ACTIVE | COMMUNITY

## 2025-04-17 RX ORDER — TRIAMTERENE AND HYDROCHLOROTHIAZIDE 75; 50 MG/1; MG/1
1 TABLET IN THE MORNING TABLET ORAL ONCE A DAY
Status: ACTIVE | COMMUNITY

## 2025-04-17 RX ORDER — HYOSCYAMINE SULFATE 0.125 MG
1 - 2 TABLETS UNDER THE TONGUE AND ALLOW TO DISSOLVE TABLET,DISINTEGRATING ORAL
Qty: 60 | Refills: 3 | Status: ACTIVE | COMMUNITY
Start: 2025-02-20 | End: 2025-06-20

## 2025-04-17 RX ORDER — CIPROFLOXACIN 500 MG/1
1 TABLET TABLET, FILM COATED ORAL
Qty: 20 TABLET | Refills: 3 | OUTPATIENT
Start: 2025-04-17 | End: 2025-05-27

## 2025-04-17 NOTE — HPI-TODAY'S VISIT:
This is a 79-year-old female with a history of hypertension, hyperlipidemia, breast cancer, adenomatous colon polyps due surveillance in 2026, GERD, chronic intermittent abdominal pain, bloating, nausea, and constipation associated with functional bowel disorder presenting for follow-up.  She was last seen on 2/24/25 by Casie Mckeon.   She was previously seen 8/20/2024.  GERD was controlled with omeprazole 40 mg daily.  She had infrequent episodes of bloating relieved with Carafate.  She was to continue medications.  Morning nausea was infrequent and usually relieved with a carbonated beverage.  She had chronic constipation which was managed with Linzess 145 mcg daily or Linzess 72 mcg daily.  She was to continue daily use of 1 or the other.  She was to increase 145 mcg to 2/day if needed.  She called our office in November 2024 complaining of anal pain.  She denied perianal swelling or bleeding.  She denied constipation or straining.  At a follow-up call, she reported she was feeling much better taking Carafate and hyoscyamine.  At her 2/20/25 OV, she reported lower abdominal pain(LLQ, RLQ, suprapubic) which she described as "soreness."  . Hyoscyamine improved the pain but did not resolve it.  She was occasionally tender in the area. On 2/17, she had a fever of 101.9 with chills. She has a history of intermittent nausea for which she takes ondansetron which had been worse since onset of abdominal pain.  She generally feels unwell and reports loss of appetite.  Her weight is stable.  She reports that she feels "stuffed with gas" that is difficult to pass prior to her bowel movements.  She is taking Linzess 72 mcg daily and 2 stool softeners.  She typically has 2 or 3 bowel movements every morning.  If she felt constipated, she would take an extra dose of Linzess. She denied red blood per rectum or melena.  Heartburn was controlled with omeprazole 40 mg daily and Carafate as needed.  Patient had a CT scan of the abdomen pelvis ordered after last office visit, along with a urinalysis.  CT was done on 2/26/2025 and was nondiagnostic for any acute pathology.  The patient called the office on March 12, 2025 complaining of severe abdominal pain and ultimately went to the ER at [facility] that day and had a repeat CT scan which showed evidence of diverticulitis, per the patient. The CT scan done on March 12, 2025 showed acute uncomplicated sigmoid diverticulitis.  The patient was given ciprofloxacin and metronidazole for 7 days, and her symptoms dramatically improved after this was given.  She is now back to baseline.  She is eating 6 small meals per day and has noted decreased bloating on this regimen.  The patient's last colonoscopy was done on March 18, 2021 and showed left colon diverticulosis, grade 1 internal hemorrhoids, but no other abnormalities.  There is a 6 mm transverse colon polyp which was a tubular adenoma.  A follow-up colonoscopy is recommended for March 2026.

## 2025-07-09 ENCOUNTER — OFFICE VISIT (OUTPATIENT)
Dept: URBAN - METROPOLITAN AREA CLINIC 107 | Facility: CLINIC | Age: 80
End: 2025-07-09
Payer: COMMERCIAL

## 2025-07-09 DIAGNOSIS — K21.9 GERD WITHOUT ESOPHAGITIS: ICD-10-CM

## 2025-07-09 DIAGNOSIS — R10.32 LEFT LOWER QUADRANT ABDOMINAL PAIN: ICD-10-CM

## 2025-07-09 DIAGNOSIS — K57.92 ACUTE DIVERTICULITIS: ICD-10-CM

## 2025-07-09 DIAGNOSIS — R11.0 NAUSEA: ICD-10-CM

## 2025-07-09 DIAGNOSIS — R10.31 RIGHT LOWER QUADRANT ABDOMINAL PAIN: ICD-10-CM

## 2025-07-09 DIAGNOSIS — K58.1 IRRITABLE BOWEL SYNDROME WITH CONSTIPATION: ICD-10-CM

## 2025-07-09 DIAGNOSIS — R35.0 INCREASED URINARY FREQUENCY: ICD-10-CM

## 2025-07-09 PROCEDURE — 99214 OFFICE O/P EST MOD 30 MIN: CPT | Performed by: INTERNAL MEDICINE

## 2025-07-09 RX ORDER — ONDANSETRON 4 MG/1
PLACE 1 TABLET ON THE TONGUE AND ALLOW TO DISSOLVE, EVERY 4-6 HOURS AS NEEDED FOR NAUSEA TABLET, ORALLY DISINTEGRATING ORAL
Qty: 90 | Refills: 1
Start: 2025-07-12

## 2025-07-09 RX ORDER — SUCRALFATE 1 G/1
1 TABLET TABLET ORAL
Qty: 30 | Refills: 3
Start: 2025-07-12

## 2025-07-09 RX ORDER — METOPROLOL TARTRATE 25 MG/1
1/2 TABLET WITH FOOD TABLET, FILM COATED ORAL TWICE A DAY
Status: ACTIVE | COMMUNITY

## 2025-07-09 RX ORDER — HYOSCYAMINE SULFATE 0.125 MG
1 - 2 TABLETS UNDER THE TONGUE AND ALLOW TO DISSOLVE TABLET,DISINTEGRATING ORAL
Qty: 60 | Refills: 3 | Status: ACTIVE | COMMUNITY
Start: 2025-04-17

## 2025-07-09 RX ORDER — ANASTROZOLE 1 MG/1
1 TABLET TABLET, FILM COATED ORAL ONCE A DAY
Status: ACTIVE | COMMUNITY

## 2025-07-09 RX ORDER — LINACLOTIDE 72 UG/1
TAKE 1 CAPSULE BY MOUTH ONCE DAILY AT LEAST 30 MINUTES BEFORE THE FIRST MEAL OF THE DAY ON AN EMPTY STOMACH CAPSULE, GELATIN COATED ORAL
Qty: 90 | Refills: 3
Start: 2025-07-12 | End: 2026-07-07

## 2025-07-09 RX ORDER — TRIAMTERENE AND HYDROCHLOROTHIAZIDE 75; 50 MG/1; MG/1
1 TABLET IN THE MORNING TABLET ORAL ONCE A DAY
Status: ACTIVE | COMMUNITY

## 2025-07-09 RX ORDER — ONDANSETRON 4 MG/1
PLACE 1 TABLET ON THE TONGUE AND ALLOW TO DISSOLVE, EVERY 4-6 HOURS AS NEEDED FOR NAUSEA TABLET, ORALLY DISINTEGRATING ORAL
Qty: 90 | Refills: 1 | Status: ACTIVE | COMMUNITY
Start: 2025-04-17

## 2025-07-09 RX ORDER — OMEPRAZOLE 40 MG/1
TAKE 1 CAPSULE BY MOUTH ONCE DAILY 30 MINUTES BEFORE MORNING MEAL CAPSULE, DELAYED RELEASE ORAL ONCE A DAY
Qty: 90 | Refills: 3
Start: 2025-07-12

## 2025-07-09 RX ORDER — HYOSCYAMINE SULFATE 0.125 MG
1 - 2 TABLETS UNDER THE TONGUE AND ALLOW TO DISSOLVE TABLET,DISINTEGRATING ORAL
Qty: 60 | Refills: 3 | OUTPATIENT
Start: 2025-07-12

## 2025-07-09 RX ORDER — OMEPRAZOLE 40 MG/1
TAKE 1 CAPSULE BY MOUTH ONCE DAILY 30 MINUTES BEFORE MORNING MEAL CAPSULE, DELAYED RELEASE ORAL ONCE A DAY
Qty: 90 | Refills: 3 | Status: ACTIVE | COMMUNITY
Start: 2025-04-17

## 2025-07-09 RX ORDER — LINACLOTIDE 72 UG/1
TAKE 1 CAPSULE BY MOUTH ONCE DAILY AT LEAST 30 MINUTES BEFORE THE FIRST MEAL OF THE DAY ON AN EMPTY STOMACH CAPSULE, GELATIN COATED ORAL
Qty: 90 | Refills: 3 | Status: ACTIVE | COMMUNITY
Start: 2025-04-17 | End: 2026-04-12

## 2025-07-09 RX ORDER — SUCRALFATE 1 G/1
1 TABLET TABLET ORAL
Qty: 30 | Refills: 3 | Status: ACTIVE | COMMUNITY
Start: 2025-04-17

## 2025-07-09 RX ORDER — LINACLOTIDE 145 UG/1
TAKE 1 CAPSULE BY MOUTH ON AN EMPTY STOMACH AT LEAST 30 MINUTES BEFORE THE FIRST MEAL OF THE DAY CAPSULE, GELATIN COATED ORAL
Qty: 30 | Refills: 3 | Status: ACTIVE | COMMUNITY
Start: 2025-04-17 | End: 2026-04-12

## 2025-07-09 NOTE — HPI-TODAY'S VISIT:
This is a 79-year-old female with a history of hypertension, hyperlipidemia, breast cancer, adenomatous colon polyps due surveillance in 2026, GERD, chronic intermittent abdominal pain, bloating, nausea, and constipation associated with functional bowel disorder presenting for follow-up.  She was last seen on 4/17/25.  She was previously seen 8/20/2024.  GERD was controlled with omeprazole 40 mg daily.  She had infrequent episodes of bloating relieved with Carafate.  She was to continue medications.  Morning nausea was infrequent and usually relieved with a carbonated beverage.  She had chronic constipation which was managed with Linzess 145 mcg daily or Linzess 72 mcg daily.  She was to continue daily use of 1 or the other.  She was to increase 145 mcg to 2/day if needed.  She called our office in November 2024 complaining of anal pain.  She denied perianal swelling or bleeding.  She denied constipation or straining.  At a follow-up call, she reported she was feeling much better taking Carafate and hyoscyamine.  At her 2/20/25 OV, she reported lower abdominal pain(LLQ, RLQ, suprapubic) which she described as "soreness."  . Hyoscyamine improved the pain but did not resolve it.  She was occasionally tender in the area. On 2/17, she had a fever of 101.9 with chills. She has a history of intermittent nausea for which she takes ondansetron which had been worse since onset of abdominal pain.  She generally feels unwell and reports loss of appetite.  Her weight is stable.  She reported at her 2/20/25 OV that she felt "stuffed with gas" that was difficult to pass prior to her bowel movements.  She was taking Linzess 72 mcg daily and 2 stool softeners.  She typically was having 2 or 3 bowel movements every morning.  If she felt constipated, she would take an extra dose of Linzess. She denied red blood per rectum or melena.  Heartburn was controlled with omeprazole 40 mg daily and Carafate as needed.  Patient had a CT scan of the abdomen pelvis ordered, along with a urinalysis.  CT was done on 2/26/2025 and was nondiagnostic for any acute pathology.    The patient called the office on March 12, 2025 complaining of severe abdominal pain and ultimately went to the ER at [facility] that day and had a repeat CT scan which showed evidence of diverticulitis, per the patient. The CT scan done on March 12, 2025 showed acute uncomplicated sigmoid diverticulitis.  The patient was given ciprofloxacin and metronidazole for 7 days, and her symptoms dramatically improved.  She was back to baseline by the time of her 4/17/25 OV.  She was eating 6 small meals per day and has noted decreased bloating on this regimen.  The patient's last colonoscopy was done on March 18, 2021 and showed left colon diverticulosis, grade 1 internal hemorrhoids, but no other abnormalities.  There is a 6 mm transverse colon polyp which was a tubular adenoma.  A follow-up colonoscopy is recommended for March 2026.  The patient has not had any recurrent diverticulitis recently.  Reflux symptoms are controlled on omeprazole and sucralfate.  She is having acceptable bowel habits on 72 mcg of Linzess daily, and she has had no nausea recently.  She tells me she "really feels a lot better."  She has no acute GI complaints today.